# Patient Record
Sex: FEMALE | URBAN - METROPOLITAN AREA
[De-identification: names, ages, dates, MRNs, and addresses within clinical notes are randomized per-mention and may not be internally consistent; named-entity substitution may affect disease eponyms.]

---

## 2023-04-14 ENCOUNTER — HOSPITAL ENCOUNTER (OUTPATIENT)
Dept: GENERAL RADIOLOGY | Facility: HOSPITAL | Age: 23
Discharge: HOME | End: 2023-04-14
Attending: PREVENTIVE MEDICINE

## 2023-04-14 ENCOUNTER — OFFICE VISIT (OUTPATIENT)
Dept: OTHER | Facility: HOSPITAL | Age: 23
End: 2023-04-14
Attending: PREVENTIVE MEDICINE
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
## 2023-04-14 DIAGNOSIS — M25.532 ACUTE PAIN OF LEFT WRIST: ICD-10-CM

## 2023-04-14 DIAGNOSIS — M25.532 ACUTE PAIN OF LEFT WRIST: Primary | ICD-10-CM

## 2023-04-14 PROCEDURE — 73110 X-RAY EXAM OF WRIST: CPT | Performed by: PREVENTIVE MEDICINE

## 2025-03-20 LAB
AMB EXT CREATININE, URINE: 162.2 MG/DL
AMB EXT MALB URINE CALC: 1 MG/24HR
AMB EXT MALB URINE CALC: 1 MG/24HR

## 2025-05-09 ENCOUNTER — OFFICE VISIT (OUTPATIENT)
Dept: INTERNAL MEDICINE CLINIC | Facility: CLINIC | Age: 25
End: 2025-05-09
Payer: MEDICAID

## 2025-05-09 VITALS
HEART RATE: 96 BPM | SYSTOLIC BLOOD PRESSURE: 106 MMHG | BODY MASS INDEX: 27.16 KG/M2 | WEIGHT: 165 LBS | TEMPERATURE: 98 F | HEIGHT: 65.51 IN | DIASTOLIC BLOOD PRESSURE: 64 MMHG | OXYGEN SATURATION: 98 %

## 2025-05-09 DIAGNOSIS — N92.1 MENORRHAGIA WITH IRREGULAR CYCLE: ICD-10-CM

## 2025-05-09 DIAGNOSIS — Z13.0 SCREENING FOR DEFICIENCY ANEMIA: ICD-10-CM

## 2025-05-09 DIAGNOSIS — E10.9 TYPE 1 DIABETES MELLITUS WITHOUT COMPLICATION (HCC): Primary | ICD-10-CM

## 2025-05-09 DIAGNOSIS — N92.6 IRREGULAR PERIODS/MENSTRUAL CYCLES: ICD-10-CM

## 2025-05-09 DIAGNOSIS — H90.3 SENSORINEURAL HEARING LOSS (SNHL) OF BOTH EARS: ICD-10-CM

## 2025-05-09 DIAGNOSIS — E06.3 HYPOTHYROIDISM DUE TO HASHIMOTO THYROIDITIS: ICD-10-CM

## 2025-05-09 DIAGNOSIS — Z12.4 CERVICAL CANCER SCREENING: ICD-10-CM

## 2025-05-09 DIAGNOSIS — Z00.00 PHYSICAL EXAM, ANNUAL: ICD-10-CM

## 2025-05-09 DIAGNOSIS — E55.9 VITAMIN D DEFICIENCY: ICD-10-CM

## 2025-05-09 PROBLEM — E03.9 HYPOTHYROIDISM: Status: ACTIVE | Noted: 2021-10-18

## 2025-05-09 RX ORDER — GLUCOSAMINE HCL/CHONDROITIN SU 500-400 MG
CAPSULE ORAL
COMMUNITY
Start: 2024-09-07

## 2025-05-09 RX ORDER — INSULIN GLARGINE 100 [IU]/ML
32 INJECTION, SOLUTION SUBCUTANEOUS DAILY
COMMUNITY

## 2025-05-09 RX ORDER — BLOOD-GLUCOSE SENSOR
EACH MISCELLANEOUS
COMMUNITY

## 2025-05-09 RX ORDER — PROCHLORPERAZINE 25 MG/1
SUPPOSITORY RECTAL
COMMUNITY

## 2025-05-09 RX ORDER — INSULIN LISPRO 100 [IU]/ML
14 INJECTION, SOLUTION INTRAVENOUS; SUBCUTANEOUS 3 TIMES DAILY
COMMUNITY
Start: 2024-09-07

## 2025-05-09 RX ORDER — LEVOTHYROXINE SODIUM 175 MCG
TABLET ORAL
COMMUNITY
Start: 2024-02-27

## 2025-05-09 RX ORDER — INSULIN LISPRO 100 [IU]/ML
INJECTION, SOLUTION INTRAVENOUS; SUBCUTANEOUS
COMMUNITY

## 2025-05-09 NOTE — PATIENT INSTRUCTIONS
VISIT SUMMARY:  Today, we discussed your irregular menstrual cycles, type 1 diabetes management, thyroid dysfunction, and hearing loss. We reviewed your current medications and their potential impact on your symptoms. We also planned follow-up actions to better manage your conditions.    YOUR PLAN:  -TYPE 1 DIABETES MELLITUS: Type 1 diabetes is a condition where your body does not produce insulin, requiring you to manage your blood sugar levels with an insulin pump. Your recent hemoglobin A1c level of 9.4% indicates that your blood sugar levels have been higher than desired. Please inform your endocrinologist about this result, continue using your insulin pump, and monitor your blood glucose levels regularly.    -HYPOTHYROIDISM DUE TO AUTOIMMUNE THYROIDITIS: Hypothyroidism is a condition where your thyroid gland does not produce enough hormones, which can affect your overall health. Your recent lapse in taking Synthroid has led to high TSH levels. You should take Synthroid 200 mcg daily with a small amount of juice followed by a full glass of water to help with absorption. We will repeat your thyroid function tests in 3 weeks to see if the current dosage is effective.    -IRREGULAR MENSTRUAL CYCLE: Irregular menstrual cycles can be caused by various factors, including thyroid dysfunction. Since you have had prolonged bleeding episodes and a history of thyroid issues, which might be related, we recommend seeing a gynecologist for a comprehensive evaluation.    INSTRUCTIONS:  Please follow up with your endocrinologist to discuss your elevated hemoglobin A1c level. Continue monitoring your blood glucose levels regularly. Take Synthroid 200 mcg daily as instructed and repeat thyroid function tests in 3 weeks. Schedule an appointment with a gynecologist for further evaluation of your irregular menstrual cycles.    Contains text generated by [employer]

## 2025-05-09 NOTE — PROGRESS NOTES
OFFICE NOTE       The following individual(s) verbally consented to be recorded using ambient AI listening technology and understand that they can each withdraw their consent to this listening technology at any point by asking the clinician to turn off or pause the recording:    Patient name: Valeria Laureano        Patient ID: Valeria Laureano is a 24 year old female.  Today's Date: 05/09/25  Chief Complaint: Establish Care      History of Present Illness  Valeria Laureano is a 24 year old female with type 1 diabetes and thyroid dysfunction who presents with irregular menstrual cycles.    She has been experiencing irregular menstrual cycles over the past few months, with inconsistent periods. One month had minimal flow lasting more than seven days, while another month started on April 24th and ended on May 7th. There was a previous delay of over two months without any sexual activity. She suspects her medication might be influencing her menstrual irregularities.    She was diagnosed with type 1 diabetes at the age of 18 after her mother, following a family doctor's advice, tested her blood glucose levels, which were found to be high. Initially asymptomatic, she later noticed increased urination and thirst. She uses an insulin pump and has been managing her diabetes with the help of her endocrinologist for the past four years. Her hemoglobin A1c was recently recorded at 9.4, indicating poor glycemic control.    She has a history of thyroid dysfunction, initially identified at age 8. She has been prescribed levothyroxine and later Synthroid due to nausea with the former. She has not been taking her thyroid medication consistently, with a recent two-month lapse before resuming on May 5th. She takes Synthroid with juice to mitigate nausea, although this might affect absorption. Her last thyroid function test on March 20th showed elevated TSH levels.    She was born with bilateral hearing loss,  with her right ear being completely deaf and her left ear having 80% hearing loss. She uses a hearing aid in her left ear.    She works part-time as an  and  at a center in [place]. She has two younger brothers and her father has type 2 diabetes. She does not smoke or consume alcohol.           Vitals:    05/09/25 1157   BP: 106/64   Pulse: 96   Temp: 97.9 °F (36.6 °C)   TempSrc: Tympanic   SpO2: 98%   Weight: 165 lb (74.8 kg)   Height: 5' 5.51\" (1.664 m)     body mass index is 27.03 kg/m².  BP Readings from Last 3 Encounters:   05/09/25 106/64     The ASCVD Risk score (Tatiana NAGY, et al., 2019) failed to calculate for the following reasons:    The 2019 ASCVD risk score is only valid for ages 40 to 79  Results  LABS  TSH: elevated (03/20/2025)  HbA1c: 9.4% (03/20/2025)  Celiac disease screen: negative  Urine protein: within normal limits  Microalbumin: within normal limits  Urine glucose: elevated  Urine WBC: elevated  MCV: 75.5 fL       Medications reviewed:  Current Medications[1]      Assessment & Plan    1. Type 1 diabetes mellitus without complication (HCC) (Primary)  Type 1 diabetes mellitus diagnosed at age 18 with poor glycemic control, indicated by a hemoglobin A1c of 9.4%. She uses an insulin pump and is under endocrinological care. Recent increase in Synthroid dosage may affect glycemic control. She is actively managing her condition.  - Inform endocrinologist about elevated hemoglobin A1c of 9.4%  - Continue insulin pump therapy  - Monitor blood glucose levels regularly    2. Irregular periods/menstrual cycles  -     OBG Referral - In Network  -     Vitamin B12; Future; Expected date: 05/09/2025  -     Folic Acid Serum (Folate); Future; Expected date: 05/09/2025  -     Ferritin; Future; Expected date: 05/09/2025  -     Iron And Tibc; Future; Expected date: 05/09/2025    3. Menorrhagia with irregular cycle  Irregular menstrual cycles with recent prolonged bleeding episodes,  likely related to non-compliance with thyroid medication. No history of sexual activity or pregnancy. Referral to gynecologist for further evaluation and management.  - Refer to gynecologist for comprehensive evaluation  -     Vitamin B12; Future; Expected date: 05/09/2025  -     Folic Acid Serum (Folate); Future; Expected date: 05/09/2025  -     Ferritin; Future; Expected date: 05/09/2025  -     Iron And Tibc; Future; Expected date: 05/09/2025  -     CBC With Differential With Platelet; Future; Expected date: 05/09/2025    4. Sensorineural hearing loss (SNHL) of both ears  Bilateral hearing loss present since birth. Right ear is completely deaf (20% hearing), left ear has 80% hearing. She uses a hearing aid in the left ear.    5. Physical exam, annual  -     Vitamin B12; Future; Expected date: 05/09/2025  -     Folic Acid Serum (Folate); Future; Expected date: 05/09/2025  -     Ferritin; Future; Expected date: 05/09/2025  -     Iron And Tibc; Future; Expected date: 05/09/2025  -     CBC With Differential With Platelet; Future; Expected date: 05/09/2025  -     Comp Metabolic Panel (14); Future; Expected date: 05/09/2025  -     Lipid Panel; Future; Expected date: 05/09/2025  -     Vitamin D; Future; Expected date: 05/09/2025    6. Screening for deficiency anemia  -     Vitamin B12; Future; Expected date: 05/09/2025  -     Folic Acid Serum (Folate); Future; Expected date: 05/09/2025  -     Ferritin; Future; Expected date: 05/09/2025  -     Iron And Tibc; Future; Expected date: 05/09/2025  -     CBC With Differential With Platelet; Future; Expected date: 05/09/2025      10. Hypothyroidism due to Hashimoto thyroiditis  Hypothyroidism secondary to autoimmune thyroiditis with recent non-compliance to Synthroid due to nausea, resulting in a two-month lapse. Recent TSH levels were high. Synthroid dosage increased to 200 mcg. She experiences nausea when taking medication with water, prefers juice which may interfere with  absorption. Proper thyroid function is essential for overall health, including menstrual regularity, energy levels, and organ function.  - Educate on proper Synthroid administration: take with a small amount of juice followed by a full glass of water  - Repeat thyroid function tests in 3 weeks to assess current Synthroid dosage  - Continue Synthroid 200 mcg daily    11. Cervical cancer screening  -     OBG Referral - In Network         Follow Up: As needed/if symptoms worsen or No follow-ups on file..     I spent  minutes obtaining pertitent medical history, reviewing pertinent imaging/labs and specialists notes, evaluating patient, discussing differential diagnosis' and various treatment options, reinforcing importance of compliance with treatment plan, and completing documentation.       Objective/ Results:   Physical Exam  Vitals reviewed.   Constitutional:       General: She is not in acute distress.     Appearance: Normal appearance. She is not ill-appearing.   HENT:      Head: Normocephalic and atraumatic.   Eyes:      Extraocular Movements: Extraocular movements intact.      Conjunctiva/sclera: Conjunctivae normal.      Pupils: Pupils are equal, round, and reactive to light.   Cardiovascular:      Rate and Rhythm: Normal rate and regular rhythm.      Heart sounds: No murmur heard.     No gallop.   Pulmonary:      Effort: Pulmonary effort is normal. No respiratory distress.      Breath sounds: Normal breath sounds. No stridor. No wheezing, rhonchi or rales.   Musculoskeletal:      Right lower leg: No edema.      Left lower leg: No edema.   Skin:     General: Skin is warm.      Capillary Refill: Capillary refill takes less than 2 seconds.   Neurological:      General: No focal deficit present.      Mental Status: She is alert and oriented to person, place, and time.   Psychiatric:         Mood and Affect: Mood normal.         Behavior: Behavior normal.         Thought Content: Thought content normal.          Judgment: Judgment normal.           Reviewed:    Patient Active Problem List    Diagnosis    Hypothyroidism     Hypothyroidism (acquired); [id number]: Hypothyroidism (acquired)      Type 1 diabetes mellitus without complication (HCC)    Sensorineural hearing loss (SNHL) of both ears      Allergies[2]   Short Social Hx on File[3]   Review of Systems   Constitutional: Negative.    HENT: Negative.     Eyes: Negative.    Respiratory: Negative.     Cardiovascular: Negative.    Gastrointestinal: Negative.    Endocrine: Negative.    Genitourinary: Negative.    Musculoskeletal: Negative.    Neurological: Negative.        All other systems negative unless otherwise stated in ROS or HPI above.     45 minutes spent on provision of medical services today, including chart review, obtaining and reviewing history, performing medically appropriate examination, counseling and educating patient and/or caregiver, ordering testing , medications, referrals or procedures, my independent interpretation of results, communication of results to patient and /or caregiver, care coordination, and documentation of all of the above.       Shiloh Casas MD  Internal Medicine       Call office with any questions or seek emergency care if necessary.   Patient understands and agrees to follow directions and advice.      ----------------------------------------- PATIENT INSTRUCTIONS-----------------------------------------     Patient Instructions   VISIT SUMMARY:  Today, we discussed your irregular menstrual cycles, type 1 diabetes management, thyroid dysfunction, and hearing loss. We reviewed your current medications and their potential impact on your symptoms. We also planned follow-up actions to better manage your conditions.    YOUR PLAN:  -TYPE 1 DIABETES MELLITUS: Type 1 diabetes is a condition where your body does not produce insulin, requiring you to manage your blood sugar levels with an insulin pump. Your recent hemoglobin A1c level  of 9.4% indicates that your blood sugar levels have been higher than desired. Please inform your endocrinologist about this result, continue using your insulin pump, and monitor your blood glucose levels regularly.    -HYPOTHYROIDISM DUE TO AUTOIMMUNE THYROIDITIS: Hypothyroidism is a condition where your thyroid gland does not produce enough hormones, which can affect your overall health. Your recent lapse in taking Synthroid has led to high TSH levels. You should take Synthroid 200 mcg daily with a small amount of juice followed by a full glass of water to help with absorption. We will repeat your thyroid function tests in 3 weeks to see if the current dosage is effective.    -IRREGULAR MENSTRUAL CYCLE: Irregular menstrual cycles can be caused by various factors, including thyroid dysfunction. Since you have had prolonged bleeding episodes and a history of thyroid issues, which might be related, we recommend seeing a gynecologist for a comprehensive evaluation.    INSTRUCTIONS:  Please follow up with your endocrinologist to discuss your elevated hemoglobin A1c level. Continue monitoring your blood glucose levels regularly. Take Synthroid 200 mcg daily as instructed and repeat thyroid function tests in 3 weeks. Schedule an appointment with a gynecologist for further evaluation of your irregular menstrual cycles.    Contains text generated by [employer]          The 21st Century Cures Act makes medical notes available to patients in the interest of transparency.  However, please be advised that this is a medical document.  It is intended as a peer to peer communication.  It is written in medical language and may contain abbreviations or verbiage that are technical and unfamiliar.  It may appear blunt or direct.  Medical documents are intended to carry relevant information, facts as evident, and the clinical opinion of the practitioner.          [1]   Current Outpatient Medications   Medication Sig Dispense Refill     Insulin Lispro, 1 Unit Dial, (ADMELOG SOLOSTAR) 100 UNIT/ML Subcutaneous Solution Pen-injector Inject 14 Units into the skin 3 (three) times daily.      SYNTHROID 175 MCG Oral Tab Take 1 tablet every day by oral route for 90 days.      metFORMIN 500 MG Oral Tab Take 2 tablets (1,000 mg total) by mouth 2 (two) times daily with meals.      insulin glargine 100 UNIT/ML Subcutaneous Solution Pen-injector Inject 42 Units into the skin daily.      insulin glargine (BASAGLAR KWIKPEN) 100 UNIT/ML Subcutaneous Solution Pen-injector Inject 32 Units into the skin daily.      Glucose 4-6 GM-MG Oral Chew Tab Chew 16 g by mouth.      Continuous Glucose  (DEXCOM G6 ) Does not apply Device Dexcom G6       Alcohol Swabs 70 % Does not apply Pads APPLY 4 PADS EVERY DAY BY TOPICAL ROUTE AS DIRECTED FOR 90 DAYS      Continuous Glucose Sensor (GUARDIAN 4 GLUCOSE SENSOR) Does not apply Misc USE AS DIRECTED. CHANGE EVERY 10 DAYS FOR 90 DAYS      insulin lispro (HUMALOG) 100 UNIT/ML Injection Solution Inject into the skin 3 (three) times daily before meals.      Continuous Glucose Sensor (DEXCOM G6 SENSOR) Does not apply Misc Dexcom G6 Sensor device   USE SUBCUTANEOUSLY DAILY AS DIRECTED (Patient not taking: Reported on 5/9/2025)     [2] Not on File  [3]   Social History  Socioeconomic History    Marital status: Single   Tobacco Use    Smoking status: Never     Passive exposure: Never    Smokeless tobacco: Never   Vaping Use    Vaping status: Never Used   Substance and Sexual Activity    Alcohol use: Never    Drug use: Never   Other Topics Concern    Caffeine Concern No    Exercise No    Special Diet No    Stress Concern No     Social Drivers of Health     Food Insecurity: Low Risk  (9/5/2024)    Received from [facility]    Food Insecurity     Have there been times that your food ran out, and you didn't have money to get more?: No     Are there times that you worry that this might happen?: No    Transportation Needs: Low Risk  (9/5/2024)    Received from [facility]    Transportation Needs     Do you have trouble getting transportation to medical appointments?: No   Housing Stability: Low Risk  (9/5/2024)    Received from [facility]    Housing Stability     Are you worried that your electric, gas, oil, or water might be shut off?: No     Are you concerned about having a safe and reliable place to live?: No

## 2025-05-19 ENCOUNTER — LAB ENCOUNTER (OUTPATIENT)
Dept: LAB | Age: 25
End: 2025-05-19
Attending: STUDENT IN AN ORGANIZED HEALTH CARE EDUCATION/TRAINING PROGRAM
Payer: MEDICAID

## 2025-05-19 DIAGNOSIS — E04.2 NONTOXIC MULTINODULAR GOITER: ICD-10-CM

## 2025-05-19 DIAGNOSIS — E87.0 TYPE I DIABETES MELLITUS WITH HYPEROSMOLAR COMA (HCC): Primary | ICD-10-CM

## 2025-05-19 DIAGNOSIS — N92.6 IRREGULAR PERIODS/MENSTRUAL CYCLES: ICD-10-CM

## 2025-05-19 DIAGNOSIS — N92.1 MENORRHAGIA WITH IRREGULAR CYCLE: ICD-10-CM

## 2025-05-19 DIAGNOSIS — I51.9 MYXEDEMA HEART DISEASE: ICD-10-CM

## 2025-05-19 DIAGNOSIS — E03.9 MYXEDEMA HEART DISEASE: ICD-10-CM

## 2025-05-19 DIAGNOSIS — Z13.0 SCREENING FOR DEFICIENCY ANEMIA: ICD-10-CM

## 2025-05-19 DIAGNOSIS — E10.69 TYPE I DIABETES MELLITUS WITH HYPEROSMOLAR COMA (HCC): Primary | ICD-10-CM

## 2025-05-19 DIAGNOSIS — Z00.00 PHYSICAL EXAM, ANNUAL: ICD-10-CM

## 2025-05-19 DIAGNOSIS — E10.65 TYPE I DIABETES MELLITUS WITH HYPEROSMOLAR COMA (HCC): Primary | ICD-10-CM

## 2025-05-19 LAB
ALBUMIN SERPL-MCNC: 4.5 G/DL (ref 3.2–4.8)
ALBUMIN/GLOB SERPL: 1.7 {RATIO} (ref 1–2)
ALP LIVER SERPL-CCNC: 146 U/L (ref 37–98)
ALT SERPL-CCNC: 36 U/L (ref 10–49)
ANION GAP SERPL CALC-SCNC: 8 MMOL/L (ref 0–18)
AST SERPL-CCNC: 27 U/L (ref ?–34)
BASOPHILS # BLD AUTO: 0.06 X10(3) UL (ref 0–0.2)
BASOPHILS NFR BLD AUTO: 0.6 %
BILIRUB SERPL-MCNC: 0.4 MG/DL (ref 0.3–1.2)
BUN BLD-MCNC: 10 MG/DL (ref 9–23)
CALCIUM BLD-MCNC: 9.4 MG/DL (ref 8.7–10.6)
CHLORIDE SERPL-SCNC: 108 MMOL/L (ref 98–112)
CHOLEST SERPL-MCNC: 149 MG/DL (ref ?–200)
CO2 SERPL-SCNC: 25 MMOL/L (ref 21–32)
CREAT BLD-MCNC: 0.71 MG/DL (ref 0.55–1.02)
DEPRECATED HBV CORE AB SER IA-ACNC: 7 NG/ML (ref 50–306)
EGFRCR SERPLBLD CKD-EPI 2021: 120 ML/MIN/1.73M2 (ref 60–?)
EOSINOPHIL # BLD AUTO: 0.16 X10(3) UL (ref 0–0.7)
EOSINOPHIL NFR BLD AUTO: 1.7 %
ERYTHROCYTE [DISTWIDTH] IN BLOOD BY AUTOMATED COUNT: 15.9 %
EST. AVERAGE GLUCOSE BLD GHB EST-MCNC: 200 MG/DL (ref 68–126)
FASTING PATIENT LIPID ANSWER: YES
FASTING STATUS PATIENT QL REPORTED: YES
FOLATE SERPL-MCNC: 13.6 NG/ML (ref 5.4–?)
GLOBULIN PLAS-MCNC: 2.6 G/DL (ref 2–3.5)
GLUCOSE BLD-MCNC: 88 MG/DL (ref 70–99)
HBA1C MFR BLD: 8.6 % (ref ?–5.7)
HCT VFR BLD AUTO: 40.5 % (ref 35–48)
HDLC SERPL-MCNC: 45 MG/DL (ref 40–59)
HGB BLD-MCNC: 12 G/DL (ref 12–16)
IMM GRANULOCYTES # BLD AUTO: 0.03 X10(3) UL (ref 0–1)
IMM GRANULOCYTES NFR BLD: 0.3 %
IRON SATN MFR SERPL: 7 % (ref 15–50)
IRON SERPL-MCNC: 27 UG/DL (ref 50–170)
LDLC SERPL CALC-MCNC: 87 MG/DL (ref ?–100)
LYMPHOCYTES # BLD AUTO: 2.37 X10(3) UL (ref 1–4)
LYMPHOCYTES NFR BLD AUTO: 24.7 %
MCH RBC QN AUTO: 22.9 PG (ref 26–34)
MCHC RBC AUTO-ENTMCNC: 29.6 G/DL (ref 31–37)
MCV RBC AUTO: 77.3 FL (ref 80–100)
MONOCYTES # BLD AUTO: 0.52 X10(3) UL (ref 0.1–1)
MONOCYTES NFR BLD AUTO: 5.4 %
NEUTROPHILS # BLD AUTO: 6.45 X10 (3) UL (ref 1.5–7.7)
NEUTROPHILS # BLD AUTO: 6.45 X10(3) UL (ref 1.5–7.7)
NEUTROPHILS NFR BLD AUTO: 67.3 %
NONHDLC SERPL-MCNC: 104 MG/DL (ref ?–130)
OSMOLALITY SERPL CALC.SUM OF ELEC: 290 MOSM/KG (ref 275–295)
PLATELET # BLD AUTO: 286 10(3)UL (ref 150–450)
POTASSIUM SERPL-SCNC: 3.8 MMOL/L (ref 3.5–5.1)
PROT SERPL-MCNC: 7.1 G/DL (ref 5.7–8.2)
RBC # BLD AUTO: 5.24 X10(6)UL (ref 3.8–5.3)
SODIUM SERPL-SCNC: 141 MMOL/L (ref 136–145)
T4 FREE SERPL-MCNC: 1.5 NG/DL (ref 0.8–1.7)
TOTAL IRON BINDING CAPACITY: 395 UG/DL (ref 250–425)
TRANSFERRIN SERPL-MCNC: 313 MG/DL (ref 250–380)
TRIGL SERPL-MCNC: 92 MG/DL (ref 30–149)
TSI SER-ACNC: 3.08 UIU/ML (ref 0.55–4.78)
VIT B12 SERPL-MCNC: 291 PG/ML (ref 211–911)
VIT D+METAB SERPL-MCNC: 25.7 NG/ML (ref 30–100)
VLDLC SERPL CALC-MCNC: 15 MG/DL (ref 0–30)
WBC # BLD AUTO: 9.6 X10(3) UL (ref 4–11)

## 2025-05-19 PROCEDURE — 80053 COMPREHEN METABOLIC PANEL: CPT

## 2025-05-19 PROCEDURE — 82607 VITAMIN B-12: CPT

## 2025-05-19 PROCEDURE — 82746 ASSAY OF FOLIC ACID SERUM: CPT

## 2025-05-19 PROCEDURE — 83550 IRON BINDING TEST: CPT

## 2025-05-19 PROCEDURE — 82728 ASSAY OF FERRITIN: CPT

## 2025-05-19 PROCEDURE — 36415 COLL VENOUS BLD VENIPUNCTURE: CPT

## 2025-05-19 PROCEDURE — 83540 ASSAY OF IRON: CPT

## 2025-05-19 PROCEDURE — 80061 LIPID PANEL: CPT

## 2025-05-19 PROCEDURE — 84443 ASSAY THYROID STIM HORMONE: CPT

## 2025-05-19 PROCEDURE — 85025 COMPLETE CBC W/AUTO DIFF WBC: CPT

## 2025-05-19 PROCEDURE — 83036 HEMOGLOBIN GLYCOSYLATED A1C: CPT

## 2025-05-19 PROCEDURE — 82306 VITAMIN D 25 HYDROXY: CPT

## 2025-05-19 PROCEDURE — 84439 ASSAY OF FREE THYROXINE: CPT

## 2025-06-26 ENCOUNTER — OFFICE VISIT (OUTPATIENT)
Facility: CLINIC | Age: 25
End: 2025-06-26
Payer: MEDICAID

## 2025-06-26 VITALS
SYSTOLIC BLOOD PRESSURE: 126 MMHG | HEART RATE: 91 BPM | WEIGHT: 170 LBS | HEIGHT: 65 IN | DIASTOLIC BLOOD PRESSURE: 78 MMHG | BODY MASS INDEX: 28.32 KG/M2

## 2025-06-26 DIAGNOSIS — R14.0 ABDOMINAL DISTENSION: ICD-10-CM

## 2025-06-26 DIAGNOSIS — Z91.199 NON COMPLIANCE WITH MEDICAL TREATMENT: ICD-10-CM

## 2025-06-26 DIAGNOSIS — N92.6 IRREGULAR PERIODS: Primary | ICD-10-CM

## 2025-06-26 DIAGNOSIS — L68.0 HIRSUTISM: ICD-10-CM

## 2025-06-26 DIAGNOSIS — E10.65 POORLY CONTROLLED TYPE 1 DIABETES MELLITUS (HCC): ICD-10-CM

## 2025-06-26 DIAGNOSIS — Z11.3 SCREENING EXAMINATION FOR STD (SEXUALLY TRANSMITTED DISEASE): ICD-10-CM

## 2025-06-26 LAB
CONTROL LINE PRESENT WITH A CLEAR BACKGROUND (YES/NO): YES YES/NO
KIT LOT #: NORMAL NUMERIC
PREGNANCY TEST, URINE: NEGATIVE

## 2025-06-26 PROCEDURE — 87591 N.GONORRHOEAE DNA AMP PROB: CPT | Performed by: OBSTETRICS & GYNECOLOGY

## 2025-06-26 PROCEDURE — 81025 URINE PREGNANCY TEST: CPT | Performed by: OBSTETRICS & GYNECOLOGY

## 2025-06-26 PROCEDURE — 87491 CHLMYD TRACH DNA AMP PROBE: CPT | Performed by: OBSTETRICS & GYNECOLOGY

## 2025-06-26 PROCEDURE — 99214 OFFICE O/P EST MOD 30 MIN: CPT | Performed by: OBSTETRICS & GYNECOLOGY

## 2025-06-26 RX ORDER — MEDROXYPROGESTERONE ACETATE 10 MG
10 TABLET ORAL NIGHTLY
Qty: 10 TABLET | Refills: 0 | Status: SHIPPED | OUTPATIENT
Start: 2025-06-26 | End: 2025-07-06

## 2025-06-26 NOTE — H&P
[facility]  Obstetrics and Gynecology   History & Physical  NEW    The 21st Century Cures Act makes medical notes like these available to patients in the interest of transparency. Please be advised this is a medical document. Medical documents are intended to carry relevant information, facts as evident, and the clinical opinion of the practitioner. The medical note is intended as peer to peer communication and may appear blunt or direct. It is written in medical language and may contain abbreviations or verbiage that are unfamiliar.     Chief complaint:   Chief Complaint   Patient presents with    Gyn Problem     New Patient- Referred by PCP for irregular periods  -patient reports periods are lighter now and irregular.        Subjective:     HPI: Valeria Laureano is a 24 year old  with Patient's last menstrual period was 2025 (exact date).   Here for irregular menses. Referred by PCP  Chaperone for exam declines    DM Type 1 (dx age 18, on insulin pump), Hypothyroidism (problems starting at age 8), SNHL both ears (Right ear deaf, Left ear 80% hearing loss - hearing aid used in left ear), irregular menses, vit D deficiency, iron deficiency    22 Admission for DKA at [facility]    23 PCP Shiloh Casas MD   -irregular menstrual cycles over the past few months, with inconsistent periods. One month had minimal flow lasting more than seven days, while another month started on  and ended on May 7th. There was a previous delay of over two months without any sexual activity. She suspects her medication might be influencing her menstrual irregularities.   -hemoglobin A1c 9.4, indicating poor glycemic control   -thyroid dysfunction, initially identified at age 8. She has been prescribed levothyroxine and later Synthroid due to nausea with the former. She has not been taking her thyroid medication consistently, with a recent two-month lapse before resuming on May 5th.  Her last thyroid function test on March 20th showed elevated TSH levels.   -Irregular menstrual cycles with recent prolonged bleeding episodes, likely related to non-compliance with thyroid medication.   -Labs done: CBC, CMP, ferritin, iron, vit D, folic acid, lipids, vit B12  -Referred to GYN for menometrorrhagia     ~~~~~~~~~~~~~~~~~~~~  6/26/2025 Referred by PCP for a few months of irregular menses. Patient admitted to PCP that she had not been taking her thyroid medication consistently & had at least a 2 month gap in taking her medication with elevated TSH level noted. Her DM1 is not well controlled either with A1c 9.4.    H/o usually regular menses every month with heavier flow about every 3-4 months or so, significant cramping since menarche.     Since 2/2025 - the periods got irregular.   The periods are either shorter or longer cycles than normal & not painful like her usual periods.   Sometimes skips up to 2 months at a time.     Denies possibility of pregnancy  Denies history of penetrative intercourse    Had a different insulin pump  Sugars were all over   The last 1 month she has a new pump & the sugars are better.      Was off her thyroid medication  Has been taking the Synthroid lately & reports thyroid went back into normal range.     A few weeks ago had some diarrhea.   Some stomach pain before she came here & is constipated today.   Water - fair    BMI overweight   Exercise - walking lot   Diet - no big changes   Eating disorders N  Weight change? Stable   Stress level not too bad  Sleep - 7 hr or so   Hirsutism some on the chin - some plucking   Acne a little on shoulders & face   Galactorrhea - no. No breast changes   Headache - infrequent. Sometimes black dots in vision during the headache. For about 30 seconds  Vision changes N     Labs for irregular menses N  Pelvic US - N  Endometrial biopsy or hysteroscopy/D&C? N      Patient Care Team:  Shiloh Casas MD as PCP - General (Internal Medicine)      Review of Systems   Constitutional:  Negative for diaphoresis and unexpected weight change.        A few times was hot at night     BMI overweight   Exercise - walking lot   Diet - no big changes   Eating disorders N  Weight change? Stable    Eyes:  Negative for visual disturbance.   Gastrointestinal:  Positive for constipation.        A few weeks ago had some diarrhea.   Some stomach pain before she came here & is constipated today.   Water - fair   Endocrine: Negative for cold intolerance and heat intolerance.   Genitourinary:  Positive for menstrual problem.   Skin:         Hirsutism some on the chin - some plucking   Acne a little on shoulders & face   Galactorrhea - no. No breast changes    Neurological:  Positive for headaches.        Headache - infrequent. Sometimes black dots in vision during the headache. For about 30 seconds   Psychiatric/Behavioral:  Negative for dysphoric mood and sleep disturbance. The patient is not nervous/anxious.         Stress level not too bad  Sleep - 7 hr or so      GYN Hx:   Menarche 13 x 28-30 predictable x 3-5 days x variable flow. Mainly normal flow.   Every 3-4 months the bleeding could be heavier.   No accidents   Can make it through the night  Pads - #4 or #5. Typically changes twice a day. No accidents  Sometimes clots dime sized or anthony sized.   Hasn't tried tampons     Dysmenorrhea - 1st day severe & then tapers off over the next day.   Would stay in bed for the 1st day.   Would miss school  Not missing work - pushes through  Ibuprofen - 200 or 400 mg - will take 1 time in a day.     Patient's last menstrual period was 05/08/2025 (exact date).  Pap Date:  (per patient never had a pap smear)    No current relationship  Sexually active? Never .   Dyspareunia? NA  Postcoital bleeding? NA    Plans to conceive within next 1 year? N    Contraception: abstinence   STDs: N  HPV vaccine N    Cervical cancer screening:   Pap never     Breast cancer screening:  Mammogram:  N    Colon cancer screening:  Colonoscopy N    OB History:  OB History    Para Term  AB Living   0 0 0 0 0 0   SAB IAB Ectopic Multiple Live Births   0 0 0 0 0       Medications:  Current Outpatient Medications   Medication Sig Dispense Refill    medroxyPROGESTERone Acetate 10 MG Oral Tab Take 1 tablet (10 mg total) by mouth nightly for 10 days. Within 7 days of completing the course, a bleed should occur. 10 tablet 0    SYNTHROID 175 MCG Oral Tab Take 1 tablet every day by oral route for 90 days.      metFORMIN 500 MG Oral Tab Take 2 tablets (1,000 mg total) by mouth 2 (two) times daily with meals.      Insulin Lispro, 1 Unit Dial, (ADMELOG SOLOSTAR) 100 UNIT/ML Subcutaneous Solution Pen-injector Inject 14 Units into the skin 3 (three) times daily.      insulin glargine 100 UNIT/ML Subcutaneous Solution Pen-injector Inject 42 Units into the skin daily.      insulin glargine (BASAGLAR KWIKPEN) 100 UNIT/ML Subcutaneous Solution Pen-injector Inject 32 Units into the skin daily.      Glucose 4-6 GM-MG Oral Chew Tab Chew 16 g by mouth.      Continuous Glucose  (DEXCOM G6 ) Does not apply Device Dexcom G6       Alcohol Swabs 70 % Does not apply Pads APPLY 4 PADS EVERY DAY BY TOPICAL ROUTE AS DIRECTED FOR 90 DAYS      Continuous Glucose Sensor (GUARDIAN 4 GLUCOSE SENSOR) Does not apply Misc USE AS DIRECTED. CHANGE EVERY 10 DAYS FOR 90 DAYS      Continuous Glucose Sensor (DEXCOM G6 SENSOR) Does not apply Misc Dexcom G6 Sensor device   USE SUBCUTANEOUSLY DAILY AS DIRECTED (Patient not taking: Reported on 2025)      insulin lispro (HUMALOG) 100 UNIT/ML Injection Solution Inject into the skin 3 (three) times daily before meals.        (Not in a hospital admission)    Prior to admission med list:   (Not in a hospital admission)    Inpatient Current Medication List:  No current facility-administered medications for this visit.     Scheduled Meds:     Continuous Infusions:     PRN  Medications:       All:  Not on File    PMH:  Past Medical History:   Diagnosis Date    DKA (diabetic ketoacidosis) ([facility]) 09/05/2024 9/5/24 Admission for DKA at [facility]    Hypothyroidism 2007    Migraine with aura 06/25/2025    Sometimes black dots in vision during the headache. For about 30 seconds    Sensorineural hearing loss (SNHL) of both ears 04/28/2016    Congenital hearing loss. Right ear deaf, Left ear 80% hearing loss - hearing aid used in left ear    Type 1 diabetes mellitus without complication ([facility]) 06/18/2019    Diagnosed at age 18    Uses hearing aid 2016    in left ear    Wears glasses         PSH:  Past Surgical History:   Procedure Laterality Date    Dental surgery procedure      surgery to redirect how the teeth were aligning.       Social History:  Tobacco:  She has never smoked tobacco.     Social History     Socioeconomic History    Marital status: Single   Tobacco Use    Smoking status: Never     Passive exposure: Never    Smokeless tobacco: Never   Vaping Use    Vaping status: Never Used   Substance and Sexual Activity    Alcohol use: Never    Drug use: Never    Sexual activity: Never     Birth control/protection: Abstinence     Comment: denies history of penetrative intercourse   Other Topics Concern    Caffeine Concern No    Exercise No    Seat Belt No    Special Diet No    Stress Concern No    Weight Concern No     Social Drivers of Health     Food Insecurity: Low Risk  (9/5/2024)    Received from [facility]    Food Insecurity     Have there been times that your food ran out, and you didn't have money to get more?: No     Are there times that you worry that this might happen?: No   Transportation Needs: Low Risk  (9/5/2024)    Received from [facility]    Transportation Needs     Do you have trouble getting transportation to medical appointments?: No   Housing Stability: Low Risk  (9/5/2024)    Received from [facility]  [facility]    Housing Stability     Are you worried that your electric, gas, oil, or water might be shut off?: No     Are you concerned about having a safe and reliable place to live?: No        Family History:  Family History   Problem Relation Age of Onset    Diabetes Father 45        type 2    Breast Cancer Neg     Ovarian Cancer Neg     Colon Cancer Neg     Birth Defects Neg     Genetic Disease Neg     Endometriosis Neg     Infertility Neg     Thyroid disease Neg        Immunization History:  Immunization History   Administered Date(s) Administered    Covid-19 Vaccine Moderna 100 mcg/0.5 ml 12/18/2021    Tb Intradermal Test 07/15/2021       Depression screening  Depression Screening (PHQ-2/PHQ-9): Over the LAST 2 WEEKS                       Depression: Not at risk (5/9/2025)    PHQ-2     PHQ-2 Score: 1                 Objective:     Vitals:    06/26/25 1107   BP: 126/78   Pulse: 91   Weight: 170 lb (77.1 kg)   Height: 65\"     Body mass index is 28.29 kg/m².    BP Readings from Last 10 Encounters:   06/26/25 126/78   05/09/25 106/64        Wt Readings from Last 10 Encounters:   06/26/25 170 lb (77.1 kg)   05/09/25 165 lb (74.8 kg)       BMI Readings from Last 10 Encounters:   06/26/25 28.29 kg/m²   05/09/25 27.03 kg/m²         Physical Exam:  Physical Exam  Vitals and nursing note reviewed.   Constitutional:       Appearance: Normal appearance.   HENT:      Head: Normocephalic and atraumatic.   Eyes:      Extraocular Movements: Extraocular movements intact.      Conjunctiva/sclera: Conjunctivae normal.   Neck:      Comments: No thyromegaly  Cardiovascular:      Rate and Rhythm: Normal rate and regular rhythm.      Heart sounds: No murmur heard.  Pulmonary:      Effort: Pulmonary effort is normal.      Breath sounds: Normal breath sounds.   Abdominal:      General: There is distension.      Palpations: Abdomen is soft. There is no mass.      Tenderness: There is no abdominal tenderness. There is no  guarding or rebound.      Hernia: No hernia is present.      Comments: Slightly obese, Mild to moderately distended abdomen but no distinct mass appreciated. Not tympanitic. +Insulin pump   Genitourinary:     Comments: Deferred for time      Musculoskeletal:      Right lower leg: No edema.      Left lower leg: No edema.   Neurological:      General: No focal deficit present.      Mental Status: She is alert.   Psychiatric:         Mood and Affect: Mood normal.         Judgment: Judgment normal.      Comments: A few somewhat tangential thoughts         Labs:  Lab Results   Component Value Date    WBC 9.6 05/19/2025    RBC 5.24 05/19/2025    HGB 12.0 05/19/2025    HCT 40.5 05/19/2025    MCV 77.3 (L) 05/19/2025    MCH 22.9 (L) 05/19/2025    MCHC 29.6 (L) 05/19/2025    RDW 15.9 05/19/2025    .0 05/19/2025        Lab Results   Component Value Date    GLU 88 05/19/2025    BUN 10 05/19/2025    CREATSERUM 0.71 05/19/2025    ANIONGAP 8 05/19/2025    CA 9.4 05/19/2025    OSMOCALC 290 05/19/2025    ALKPHO 146 (H) 05/19/2025    AST 27 05/19/2025    ALT 36 05/19/2025    BILT 0.4 05/19/2025    TP 7.1 05/19/2025    ALB 4.5 05/19/2025    GLOBULIN 2.6 05/19/2025     05/19/2025    K 3.8 05/19/2025     05/19/2025    CO2 25.0 05/19/2025       Lab Results   Component Value Date    CHOLEST 149 05/19/2025    TRIG 92 05/19/2025    HDL 45 05/19/2025    LDL 87 05/19/2025    VLDL 15 05/19/2025    NONHDLC 104 05/19/2025        Lab Results   Component Value Date    T4F 1.5 05/19/2025    TSH 3.075 05/19/2025        Lab Results   Component Value Date     (H) 05/19/2025    A1C 8.6 (H) 05/19/2025       Imaging:  No results found.     Assessment:     Patient Active Problem List    Diagnosis    Non compliance with medical treatment    Poorly controlled type 1 diabetes mellitus (HCC)    Hypothyroidism     Hypothyroidism (acquired); [id number]: Hypothyroidism (acquired)      Type 1 diabetes mellitus without complication  (HCC)    Sensorineural hearing loss (SNHL) of both ears         Valeria Laureano is a 24 year old  female here for DM Type 1 (dx age 18, on insulin pump), Hypothyroidism (problems starting at age 8), SNHL both ears (Right ear deaf, Left ear 80% hearing loss - hearing aid used in left ear), irregular menses, vit D deficiency, iron deficiency    Here for recent onset of irregular menses since 2025. Typically would have quite painful periods about every month. Since 2025 cycles are either shorter or longer but they are not painful like her usual periods. Denies any major changes in her life but her diabetes is not well controlled and she was off her thyroid medication for multiple months. She does have some hirsutism on exam. Could have underlying PCOS.      Diagnoses and all orders for this visit:    Irregular periods  -     Urine Preg Test [36972]  -     17-OH-Progesterone; Future  -     Anti-Müllerian Hormone (AMH) ([employer]); Future  -     Dehydroepiandrosterone Sulfate; Future  -     Estradiol; Future  -     FSH; Future  -     Hemoglobin A1C; Future  -     Progesterone; Future  -     Prolactin; Future  -     TSH and Free T4; Future  -     Testosterone,Total and Weakly Bound w/ SHBG; Future  -     medroxyPROGESTERone Acetate 10 MG Oral Tab; Take 1 tablet (10 mg total) by mouth nightly for 10 days. Within 7 days of completing the course, a bleed should occur.  -     US PELVIS W EV (CPT=76856/93715); Future    Hirsutism  -     17-OH-Progesterone; Future  -     Dehydroepiandrosterone Sulfate; Future  -     Testosterone,Total and Weakly Bound w/ SHBG; Future  -     US PELVIS W EV (CPT=76856/12706); Future    Abdominal distension  -     US PELVIS W EV (CPT=76856/68889); Future    Poorly controlled type 1 diabetes mellitus (HCC)    Non compliance with medical treatment    Screening examination for STD (sexually transmitted disease)  -     Chlamydia/Gc Amplification; Future         Plan:      Irregular menses  -does have hirsutism and some acne   -possible underlying ovulatory dysfunction from PCOS exacerbated by non compliance with thyroid treatment and poorly controlled diabetes  -cycle day 3 labs     H/o significant dysmenorrhea   -may have underlying endometriosis, though only takes a very low dose of ibuprofen once each cycle  -pelvic US    Abdominal distension  -suspect constipation related  -pelvic US to rule out mass    Pap - never. Deferred for time   Breast exam - deferred for time  HPV vaccine is a candidate     STD screening   -at first patient said she had been sexually active but then denied this  -GC/CT off bloodwork    Contraception  -reports virginal/abstinent.   -no plans to conceive in the near future     BMI overweight   -healthy diet & exercise encouraged  #Blood pressure screen (BP must be <120/80 to be \"normal\")   -   BP Readings from Last 3 Encounters:   06/26/25 126/78   05/09/25 106/64   Patient referred to primary care provider for BP management     #Tobacco screen   Counseling given: No    RTC well woman exam with pap after labs & pelvic US done.      Diana Trinh MD  [facility] - OBGYN

## 2025-06-26 NOTE — PATIENT INSTRUCTIONS
\"Cycle Day 3 Labs\"    To obtain the most reliable results:     -Please schedule your labwork to be done on cycle day 3 (3rd day of period), early morning (must be done before 10 am), and fasting (8 hours is sufficient).     -If you need to have them done on cycle day 2, 4, or 5, that is acceptable as well.

## 2025-06-27 ENCOUNTER — TELEPHONE (OUTPATIENT)
Dept: INTERNAL MEDICINE CLINIC | Facility: CLINIC | Age: 25
End: 2025-06-27

## 2025-06-27 ENCOUNTER — OFFICE VISIT (OUTPATIENT)
Dept: INTERNAL MEDICINE CLINIC | Facility: CLINIC | Age: 25
End: 2025-06-27
Payer: MEDICAID

## 2025-06-27 VITALS
OXYGEN SATURATION: 97 % | HEART RATE: 91 BPM | TEMPERATURE: 97 F | HEIGHT: 65 IN | DIASTOLIC BLOOD PRESSURE: 66 MMHG | BODY MASS INDEX: 28.35 KG/M2 | WEIGHT: 170.13 LBS | SYSTOLIC BLOOD PRESSURE: 108 MMHG

## 2025-06-27 DIAGNOSIS — R74.8 ELEVATED ALKALINE PHOSPHATASE LEVEL: ICD-10-CM

## 2025-06-27 DIAGNOSIS — R79.89 ELEVATED LFTS: ICD-10-CM

## 2025-06-27 DIAGNOSIS — E10.9 TYPE 1 DIABETES MELLITUS WITHOUT COMPLICATION (HCC): Primary | ICD-10-CM

## 2025-06-27 DIAGNOSIS — E53.8 VITAMIN B12 DEFICIENCY: ICD-10-CM

## 2025-06-27 DIAGNOSIS — E06.3 HYPOTHYROIDISM DUE TO HASHIMOTO THYROIDITIS: ICD-10-CM

## 2025-06-27 DIAGNOSIS — N92.6 IRREGULAR PERIODS/MENSTRUAL CYCLES: ICD-10-CM

## 2025-06-27 DIAGNOSIS — E55.9 VITAMIN D DEFICIENCY: ICD-10-CM

## 2025-06-27 DIAGNOSIS — E61.1 IRON DEFICIENCY: ICD-10-CM

## 2025-06-27 LAB
C TRACH DNA SPEC QL NAA+PROBE: NEGATIVE
N GONORRHOEA DNA SPEC QL NAA+PROBE: NEGATIVE

## 2025-06-27 PROCEDURE — 99395 PREV VISIT EST AGE 18-39: CPT | Performed by: STUDENT IN AN ORGANIZED HEALTH CARE EDUCATION/TRAINING PROGRAM

## 2025-06-27 PROCEDURE — 90471 IMMUNIZATION ADMIN: CPT | Performed by: STUDENT IN AN ORGANIZED HEALTH CARE EDUCATION/TRAINING PROGRAM

## 2025-06-27 PROCEDURE — 90677 PCV20 VACCINE IM: CPT | Performed by: STUDENT IN AN ORGANIZED HEALTH CARE EDUCATION/TRAINING PROGRAM

## 2025-06-27 PROCEDURE — 96372 THER/PROPH/DIAG INJ SC/IM: CPT | Performed by: STUDENT IN AN ORGANIZED HEALTH CARE EDUCATION/TRAINING PROGRAM

## 2025-06-27 RX ORDER — FERROUS SULFATE 325(65) MG
325 TABLET, DELAYED RELEASE (ENTERIC COATED) ORAL
Qty: 90 TABLET | Refills: 2 | Status: SHIPPED | OUTPATIENT
Start: 2025-06-27

## 2025-06-27 RX ORDER — CYANOCOBALAMIN 1000 UG/ML
1000 INJECTION, SOLUTION INTRAMUSCULAR; SUBCUTANEOUS ONCE
Status: COMPLETED | OUTPATIENT
Start: 2025-06-27 | End: 2025-06-27

## 2025-06-27 RX ORDER — ERGOCALCIFEROL 1.25 MG/1
50000 CAPSULE, LIQUID FILLED ORAL WEEKLY
Qty: 12 CAPSULE | Refills: 0 | Status: SHIPPED | OUTPATIENT
Start: 2025-06-27

## 2025-06-27 RX ORDER — PROCHLORPERAZINE 25 MG/1
SUPPOSITORY RECTAL
COMMUNITY
End: 2025-06-28

## 2025-06-27 RX ADMIN — CYANOCOBALAMIN 1000 MCG: 1000 INJECTION, SOLUTION INTRAMUSCULAR; SUBCUTANEOUS at 11:35:00

## 2025-06-27 NOTE — PROGRESS NOTES
[facility]    CHIEF COMPLAINT:   Chief Complaint   Patient presents with    Physical     Physical exam, pt is feeling super.            The following individual(s) verbally consented to be recorded using ambient AI listening technology and understand that they can each withdraw their consent to this listening technology at any point by asking the clinician to turn off or pause the recording:    Patient name: Valeria Laureano      HPI:   Valeria Laureano is a 24 year old female who presents for a complete physical exam.   Problem List[1]     History of Present Illness  Valeria Laureano is a 24 year old female who presents for an annual physical exam and review of laboratory workup.    She has Type 1 Diabetes Mellitus and uses an insulin pump for management. She recently transitioned from a Medtronic pump to a Bionic pump, resulting in improved glycemic control. She monitors her carbohydrate intake, avoiding sweets and being cautious with high-carbohydrate foods such as rice, noodles, and bread. She walks about 1,000 steps daily and aims to increase this to 4,000 steps.    She has hypothyroidism and takes Synthroid before meals. Her TSH levels, previously elevated, are now stable with medication.    She experiences irregular menstrual cycles, with the last period on May 18. She is undergoing evaluation with her gynecologist, who has ordered an ultrasound and blood work. She is on medroxyprogesterone to help regulate her cycle.    She experiences occasional constipation, characterized by difficulty passing stool and abdominal pain that resolves within five minutes. She has had two recent episodes and is monitoring the situation.    She has a history of elevated alkaline phosphatase levels, persistently high since at least March 2024.    Her vitamin B12 levels are on the low side of normal, and her iron levels are low. Her vitamin D levels are also low. She is considering vitamin B12 injections and  will be taking a weekly vitamin D capsule for 12 weeks.    She has not visited a dentist in five years due to a busy schedule with multiple medical appointments. She has some black spots on her teeth, which may be cavities.    She does not smoke, drink alcohol, or use any other substances. She drives with a seatbelt and has no family history of breast or colon cancer.    Occupation:  She works part-time as an  at [employer] and  at a center in [place]. She has two younger brothers and her father has type 2 diabetes.     Exercise:  Walking   Eye exam: May 20  Dentist: Last time went to the dentist 5 years ago.  Driving: Yes, with the seatbelt  Smoking: Never  Alcohol: No  No other substance use.     Vaccinations:  Influenza: Out of season  COVID: Vaccinated x 1 J&J, no recent boosters   Pneumococcal: Due, Diabetic, will give in the office today 6/27/2025  Tdap/Td: 07/15/2021    Screenings:  Mammogram: -,  Not currently indicated ,No family history of breast cancer   Colonoscopy: -,   not indicated, No family history of Colon cancer  Diabetes screening: Last A1c value was 8.6% done 5/19/2025.    Dexa:Not on file  PAP: - . Due,  will see GYN for that  Lipid screening: Cholesterol: 149, done on 5/19/2025.  HDL Cholesterol: 45, done on 5/19/2025.  TriGlycerides 92, done on 5/19/2025.  LDL Cholesterol: 87, done on 5/19/2025.     Wt Readings from Last 6 Encounters:   06/27/25 170 lb 1.6 oz (77.2 kg)   06/26/25 170 lb (77.1 kg)   05/09/25 165 lb (74.8 kg)     Body mass index is 28.31 kg/m².       Current Medications[2]   Past Medical History[3]   Past Surgical History[4]   Family History[5]        REVIEW OF SYSTEMS:   Review of Systems   Constitutional:  Negative for chills and fever.   HENT:  Positive for hearing loss (chronic). Negative for congestion, ear pain and sinus pain.    Eyes:  Negative for blurred vision, double vision and pain.   Respiratory:  Negative for cough, shortness of breath and  wheezing.    Cardiovascular:  Negative for chest pain, palpitations and leg swelling.   Gastrointestinal:  Negative for abdominal pain, constipation, diarrhea, heartburn, nausea and vomiting.   Genitourinary:  Negative for frequency and urgency.   Musculoskeletal:  Negative for back pain and joint pain.   Skin:  Negative for rash.   Neurological:  Negative for dizziness and headaches.   Psychiatric/Behavioral: Negative.          EXAM:   /66   Pulse 91   Temp 97.3 °F (36.3 °C) (Temporal)   Ht 5' 5\" (1.651 m)   Wt 170 lb 1.6 oz (77.2 kg)   LMP 05/08/2025 (Exact Date)   SpO2 97%   BMI 28.31 kg/m²   Body mass index is 28.31 kg/m².   Physical Exam  Vitals reviewed.   Constitutional:       General: She is not in acute distress.     Appearance: Normal appearance. She is not ill-appearing or toxic-appearing.   HENT:      Head: Normocephalic and atraumatic.      Comments: Hearing aid in the right ear     Right Ear: Tympanic membrane, ear canal and external ear normal.      Left Ear: Tympanic membrane, ear canal and external ear normal.      Mouth/Throat:      Mouth: Mucous membranes are moist.      Pharynx: Oropharynx is clear. No oropharyngeal exudate or posterior oropharyngeal erythema.   Eyes:      Extraocular Movements: Extraocular movements intact.      Conjunctiva/sclera: Conjunctivae normal.      Pupils: Pupils are equal, round, and reactive to light.   Cardiovascular:      Rate and Rhythm: Normal rate and regular rhythm.      Pulses: Normal pulses.      Heart sounds: Normal heart sounds. No murmur heard.     No friction rub. No gallop.   Pulmonary:      Effort: Pulmonary effort is normal. No respiratory distress.      Breath sounds: Normal breath sounds. No stridor. No wheezing, rhonchi or rales.   Abdominal:      General: Abdomen is flat. There is no distension.      Palpations: There is no mass.      Tenderness: There is no abdominal tenderness. There is no right CVA tenderness, left CVA tenderness,  guarding or rebound.      Hernia: No hernia is present.   Musculoskeletal:      Cervical back: Normal range of motion.      Right lower leg: No edema.      Left lower leg: No edema.   Lymphadenopathy:      Cervical: No cervical adenopathy.   Skin:     General: Skin is warm.      Capillary Refill: Capillary refill takes less than 2 seconds.      Coloration: Skin is not jaundiced or pale.      Findings: No bruising, erythema, lesion or rash.   Neurological:      General: No focal deficit present.      Mental Status: She is alert and oriented to person, place, and time.      Cranial Nerves: No cranial nerve deficit.      Sensory: No sensory deficit.      Motor: No weakness.   Psychiatric:         Mood and Affect: Mood normal.         Behavior: Behavior normal.         Thought Content: Thought content normal.         Judgment: Judgment normal.          Labs:   Lab Results   Component Value Date/Time    WBC 9.6 05/19/2025 10:31 AM    HGB 12.0 05/19/2025 10:31 AM    .0 05/19/2025 10:31 AM      Lab Results   Component Value Date/Time    GLU 88 05/19/2025 10:31 AM     05/19/2025 10:31 AM    K 3.8 05/19/2025 10:31 AM     05/19/2025 10:31 AM    CO2 25.0 05/19/2025 10:31 AM    CREATSERUM 0.71 05/19/2025 10:31 AM    CA 9.4 05/19/2025 10:31 AM    ALB 4.5 05/19/2025 10:31 AM    TP 7.1 05/19/2025 10:31 AM    ALKPHO 146 (H) 05/19/2025 10:31 AM    AST 27 05/19/2025 10:31 AM    ALT 36 05/19/2025 10:31 AM    BILT 0.4 05/19/2025 10:31 AM    TSH 3.075 05/19/2025 10:31 AM    T4F 1.5 05/19/2025 10:31 AM        Lab Results   Component Value Date/Time    CHOLEST 149 05/19/2025 10:31 AM    HDL 45 05/19/2025 10:31 AM    TRIG 92 05/19/2025 10:31 AM    LDL 87 05/19/2025 10:31 AM    NONHDLC 104 05/19/2025 10:31 AM       Lab Results   Component Value Date/Time    A1C 8.6 (H) 05/19/2025 10:31 AM      Vitamin D:    Lab Results   Component Value Date    VITD 25.7 (L) 05/19/2025           ASSESSMENT AND PLAN:   Valeria Laureano  is a 24 year old female who presents for a complete physical exam.       1. Type 1 diabetes mellitus without complication (HCC)  Managing with a Bionic insulin pump and Dexcom continuous glucose monitoring. Improved blood glucose control with hemoglobin A1c at 8.6%, aiming for less than 7%. Dietary modifications and increased physical activity are recommended to further improve glycemic control.  Continue to follow with Endocrinology.  - Continue Bionic insulin pump and Dexcom continuous glucose monitor.  - Monitor hemoglobin A1c with a goal of less than 7%.  - Encourage dietary modifications to reduce carbohydrate intake and increase vegetables and lean protein.  - Increase physical activity to at least 4,000 steps per day.  - Prevnar 20 (PCV20) [90782]    2. Elevated alkaline phosphatase level  Persistently elevated alkaline phosphatase levels may indicate a liver issue. Further investigation is needed to determine the cause.  - Order liver autoimmune disorder panel.  - Consider liver ultrasound if alkaline phosphatase remains elevated.  - GGT (Gamma Glutamyl Transpeptidase); Future  - Immunoglobulin G, Quant; Future  - Alpha-1-antirypsin, serum; Future  - Ceruloplasmin; Future  - Intrinsic Factor Abs, Serum [E]; Future    3. Elevated LFTs  - Actin (Smooth Muscle) Antibody; Future  - Connective Tissue Disease (MARGARITA) Screen, Reflex Specific Antibody; Future    4. Iron deficiency  Low iron levels and slightly low MCV indicate iron deficiency. Oral iron supplements are advised, with adjustments based on tolerance. Avoid calcium intake around the time of iron supplementation to enhance absorption.  - Prescribe oral iron supplements to be taken daily, with adjustments based on tolerance.  - Recheck iron levels in 3 months.  - Avoid calcium intake 2 hours before and after taking iron supplements  - ferrous sulfate 325 (65 FE) MG Oral Tab EC; Take 1 tablet (325 mg total) by mouth daily with breakfast.  Dispense: 90  tablet; Refill: 2  - Ferritin [E]; Future    5. Vitamin D deficiency  Vitamin D levels are on the low side. A course of vitamin D supplementation is recommended to improve levels.  - Prescribe vitamin D capsules to be taken once a week for 12 weeks.  - ergocalciferol 1.25 MG (66061 UT) Oral Cap; Take 1 capsule (50,000 Units total) by mouth once a week.  Dispense: 12 capsule; Refill: 0    6. Vitamin B12 deficiency  Vitamin B12 levels are on the low side of normal. Vitamin B12 injections are recommended to address potential absorption issues. Intrinsic factor will be checked to assess absorption capability.  - Administer vitamin B12 injections weekly for 4 weeks, then monthly for 6 months.  - Check intrinsic factor to assess vitamin B12 absorption.  - cyanocobalamin (Vitamin B12) 1000 MCG/ML injection 1,000 mcg    7. Hypothyroidism due to Hashimoto thyroiditis  TSH levels are well-controlled with Synthroid. Continued adherence to Synthroid is necessary to maintain thyroid function.  - Continue Synthroid 175 MCG Oral daily.  - Monitor thyroid function tests regularly.    8. Irregular periods/menstrual cycles   Reports irregular menstrual cycles, with the last period in May. Under the care of a gynecologist who has ordered an ultrasound and blood work to investigate potential hormonal issues. Medroxyprogesterone prescribed to induce menstruation.  - Follow up with gynecologist for ultrasound and blood work results.  - Take medroxyprogesterone as prescribed to induce menstruation.    General Health Maintenance  Due for a pneumonia vaccine due to increased risk associated with diabetes. She has not visited a dentist in 5 years and is advised to consider a dental check-up.  - Administer pneumonia vaccine today.  - Encourage dental check-up and research providers who accept her insurance.    Follow-up  Requires follow-up for various health issues and screenings. Diabetic eye exam results need to be sent to the provider.  -  Follow up with gynecologist for menstrual irregularities.  - Recheck iron levels in 3 months.  - Monitor vitamin B12 and vitamin D levels after supplementation.  - Schedule liver ultrasound if indicated by lab results.  - Ensure diabetic eye exam results are sent to the provider.     45 minutes spent on provision of medical services today, including chart review, obtaining and reviewing history, performing medically appropriate examination, counseling and educating patient and/or caregiver, ordering testing , medications, referrals or procedures, my independent interpretation of results, communication of results to patient and /or caregiver, care coordination, and documentation of all of the above.     Return in about 6 months (around 12/27/2025) for Med check or earlier depending on labs.    Shiloh Casas MD         [1]   Patient Active Problem List  Diagnosis    Type 1 diabetes mellitus without complication (HCC)    Hypothyroidism    Sensorineural hearing loss (SNHL) of both ears    Non compliance with medical treatment    Poorly controlled type 1 diabetes mellitus (HCC)    Elevated alkaline phosphatase level    Iron deficiency    Vitamin D deficiency    Vitamin B12 deficiency    Irregular periods/menstrual cycles   [2]   Current Outpatient Medications   Medication Sig Dispense Refill    ferrous sulfate 325 (65 FE) MG Oral Tab EC Take 1 tablet (325 mg total) by mouth daily with breakfast. 90 tablet 2    ergocalciferol 1.25 MG (54051 UT) Oral Cap Take 1 capsule (50,000 Units total) by mouth once a week. 12 capsule 0    medroxyPROGESTERone Acetate 10 MG Oral Tab Take 1 tablet (10 mg total) by mouth nightly for 10 days. Within 7 days of completing the course, a bleed should occur. 10 tablet 0    Insulin Lispro, 1 Unit Dial, (ADMELOG SOLOSTAR) 100 UNIT/ML Subcutaneous Solution Pen-injector Inject 14 Units into the skin 3 (three) times daily.      SYNTHROID 175 MCG Oral Tab Take 1 tablet every day by oral route for 90  days. (Patient taking differently: 200 mg)      metFORMIN 500 MG Oral Tab Take 2 tablets (1,000 mg total) by mouth 2 (two) times daily with meals.      insulin glargine 100 UNIT/ML Subcutaneous Solution Pen-injector Inject 42 Units into the skin daily.      insulin glargine (BASAGLAR KWIKPEN) 100 UNIT/ML Subcutaneous Solution Pen-injector Inject 32 Units into the skin daily.      Glucose 4-6 GM-MG Oral Chew Tab Chew 16 g by mouth.      Continuous Glucose  (DEXCOM G6 ) Does not apply Device Dexcom G6       Alcohol Swabs 70 % Does not apply Pads APPLY 4 PADS EVERY DAY BY TOPICAL ROUTE AS DIRECTED FOR 90 DAYS      Continuous Glucose Sensor (GUARDIAN 4 GLUCOSE SENSOR) Does not apply Misc USE AS DIRECTED. CHANGE EVERY 10 DAYS FOR 90 DAYS      Continuous Glucose Sensor (DEXCOM G6 SENSOR) Does not apply Misc Dexcom G6 Sensor device   USE SUBCUTANEOUSLY DAILY AS DIRECTED      insulin lispro (HUMALOG) 100 UNIT/ML Injection Solution Inject into the skin 3 (three) times daily before meals.      Continuous Glucose  (DEXCOM G6 ) Does not apply Device USE SUBCUTANEOUSLY DAILY AS DIRECTED (Patient not taking: Reported on 6/27/2025)     [3]   Past Medical History:   DKA (diabetic ketoacidosis) (HCC)    9/5/24 Admission for DKA at [facility]    Hypothyroidism    Migraine with aura    Sometimes black dots in vision during the headache. For about 30 seconds    Sensorineural hearing loss (SNHL) of both ears    Congenital hearing loss. Right ear deaf, Left ear 80% hearing loss - hearing aid used in left ear    Type 1 diabetes mellitus without complication (HCC)    Diagnosed at age 18    Uses hearing aid    in left ear    Wears glasses   [4]   Past Surgical History:  Procedure Laterality Date    Dental surgery procedure      surgery to redirect how the teeth were aligning.   [5]   Family History  Problem Relation Age of Onset    Diabetes Father 45        type 2    Breast Cancer Neg      Ovarian Cancer Neg     Colon Cancer Neg     Birth Defects Neg     Genetic Disease Neg     Endometriosis Neg     Infertility Neg     Thyroid disease Neg

## 2025-06-27 NOTE — PATIENT INSTRUCTIONS
VISIT SUMMARY:  Today, you had your annual physical exam and reviewed your lab results. We discussed your management of Type 1 Diabetes, hypothyroidism, iron deficiency, vitamin B12 and D deficiencies, elevated alkaline phosphatase levels, and irregular menstrual cycles. We also talked about general health maintenance, including vaccinations and dental care.    YOUR PLAN:  -TYPE 1 DIABETES MELLITUS: Type 1 Diabetes Mellitus is a condition where your body does not produce insulin, requiring you to use an insulin pump. Your blood sugar control has improved with the Bionic insulin pump and continuous glucose monitoring. Continue using your Bionic insulin pump and Dexcom monitor, aim to reduce your hemoglobin A1c to less than 7%, modify your diet to reduce carbohydrate intake, and increase your physical activity to at least 4,000 steps per day.    -HYPOTHYROIDISM DUE TO HASHIMOTO THYROIDITIS: Hypothyroidism is a condition where your thyroid does not produce enough hormones. Your TSH levels are stable with Synthroid. Continue taking Synthroid 175 MCG daily and monitor your thyroid function regularly.    -IRON DEFICIENCY: Iron deficiency means you have low iron levels, which can cause fatigue and weakness. Take oral iron supplements daily, avoid calcium intake 2 hours before and after taking the supplements, and recheck your iron levels in 3 months.    -VITAMIN B12 DEFICIENCY: Vitamin B12 deficiency can lead to anemia and neurological issues. You will receive vitamin B12 injections weekly for 4 weeks, then monthly for 6 months. We will also check your intrinsic factor to assess absorption.    -VITAMIN D DEFICIENCY: Vitamin D deficiency can affect bone health. You will take vitamin D capsules once a week for 12 weeks to improve your levels.    -ELEVATED ALKALINE PHOSPHATASE: Elevated alkaline phosphatase levels may indicate a liver issue. We will order a liver autoimmune disorder panel and consider a liver ultrasound if  levels remain elevated.    -IRREGULAR MENSTRUAL CYCLES: Irregular menstrual cycles can be caused by hormonal imbalances. Follow up with your gynecologist for ultrasound and blood work results, and take medroxyprogesterone as prescribed to induce menstruation.    -GENERAL HEALTH MAINTENANCE: You are due for a pneumonia vaccine due to your diabetes. You are also encouraged to visit a dentist, as you have not had a dental check-up in 5 years. We will administer the pneumonia vaccine today and you should research dental providers who accept your insurance.    INSTRUCTIONS:  Follow up with your gynecologist for menstrual irregularities. Recheck your iron levels in 3 months. Monitor your vitamin B12 and vitamin D levels after supplementation. Schedule a liver ultrasound if indicated by lab results. Ensure your diabetic eye exam results are sent to us.    Contains text generated by Pk

## 2025-06-28 PROBLEM — N92.6 IRREGULAR PERIODS/MENSTRUAL CYCLES: Status: ACTIVE | Noted: 2025-06-28

## 2025-06-28 PROBLEM — R74.8 ELEVATED ALKALINE PHOSPHATASE LEVEL: Status: ACTIVE | Noted: 2025-06-28

## 2025-06-28 PROBLEM — E55.9 VITAMIN D DEFICIENCY: Status: ACTIVE | Noted: 2025-06-28

## 2025-06-28 PROBLEM — E61.1 IRON DEFICIENCY: Status: ACTIVE | Noted: 2025-06-28

## 2025-06-28 PROBLEM — E53.8 VITAMIN B12 DEFICIENCY: Status: ACTIVE | Noted: 2025-06-28

## 2025-07-03 ENCOUNTER — NURSE ONLY (OUTPATIENT)
Dept: INTERNAL MEDICINE CLINIC | Facility: CLINIC | Age: 25
End: 2025-07-03
Payer: MEDICAID

## 2025-07-03 RX ADMIN — CYANOCOBALAMIN 1000 MCG: 1000 INJECTION, SOLUTION INTRAMUSCULAR; SUBCUTANEOUS at 11:35:00

## 2025-07-09 ENCOUNTER — HOSPITAL ENCOUNTER (OUTPATIENT)
Dept: ULTRASOUND IMAGING | Age: 25
Discharge: HOME | End: 2025-07-09
Attending: OBSTETRICS & GYNECOLOGY
Payer: MEDICAID

## 2025-07-09 DIAGNOSIS — N92.6 IRREGULAR PERIODS: ICD-10-CM

## 2025-07-09 DIAGNOSIS — R14.0 ABDOMINAL DISTENSION: ICD-10-CM

## 2025-07-09 DIAGNOSIS — L68.0 HIRSUTISM: ICD-10-CM

## 2025-07-09 PROCEDURE — 76856 US EXAM PELVIC COMPLETE: CPT | Performed by: OBSTETRICS & GYNECOLOGY

## 2025-07-09 PROCEDURE — 76830 TRANSVAGINAL US NON-OB: CPT | Performed by: OBSTETRICS & GYNECOLOGY

## 2025-07-10 ENCOUNTER — MED REC SCAN ONLY (OUTPATIENT)
Dept: INTERNAL MEDICINE CLINIC | Facility: CLINIC | Age: 25
End: 2025-07-10

## 2025-07-10 ENCOUNTER — NURSE ONLY (OUTPATIENT)
Dept: INTERNAL MEDICINE CLINIC | Facility: CLINIC | Age: 25
End: 2025-07-10
Payer: MEDICAID

## 2025-07-10 RX ADMIN — CYANOCOBALAMIN 1000 MCG: 1000 INJECTION, SOLUTION INTRAMUSCULAR; SUBCUTANEOUS at 11:30:00

## 2025-07-17 ENCOUNTER — NURSE ONLY (OUTPATIENT)
Dept: INTERNAL MEDICINE CLINIC | Facility: CLINIC | Age: 25
End: 2025-07-17
Payer: MEDICAID

## 2025-07-17 PROCEDURE — 96372 THER/PROPH/DIAG INJ SC/IM: CPT | Performed by: STUDENT IN AN ORGANIZED HEALTH CARE EDUCATION/TRAINING PROGRAM

## 2025-07-17 RX ADMIN — CYANOCOBALAMIN 1000 MCG: 1000 INJECTION, SOLUTION INTRAMUSCULAR; SUBCUTANEOUS at 13:13:00

## 2025-07-17 NOTE — PROGRESS NOTES
Patient is aware this is 4th weekly B-12 since visit with Dr. Casas. The next one I is  in 1 month. Appointment scheduled. B-12 injection given IM and patient tolerated well.

## 2025-07-18 ENCOUNTER — LAB ENCOUNTER (OUTPATIENT)
Dept: LAB | Age: 25
End: 2025-07-18
Attending: STUDENT IN AN ORGANIZED HEALTH CARE EDUCATION/TRAINING PROGRAM
Payer: MEDICAID

## 2025-07-18 DIAGNOSIS — L68.0 HIRSUTISM: ICD-10-CM

## 2025-07-18 DIAGNOSIS — N92.6 IRREGULAR PERIODS: ICD-10-CM

## 2025-07-18 LAB
DHEA-S SERPL-MCNC: 194.1 UG/DL (ref 25.9–460.2)
EST. AVERAGE GLUCOSE BLD GHB EST-MCNC: 183 MG/DL (ref 68–126)
ESTRADIOL SERPL-MCNC: 39.8 PG/ML
FSH SERPL-ACNC: 4.8 MIU/ML
HBA1C MFR BLD: 8 % (ref ?–5.7)
PROGEST SERPL-MCNC: 0.65 NG/ML
PROLACTIN SERPL-MCNC: 14.7 NG/ML
T4 FREE SERPL-MCNC: 1.2 NG/DL (ref 0.8–1.7)
TSI SER-ACNC: 13.19 UIU/ML (ref 0.55–4.78)

## 2025-07-18 PROCEDURE — 82627 DEHYDROEPIANDROSTERONE: CPT

## 2025-07-18 PROCEDURE — 84443 ASSAY THYROID STIM HORMONE: CPT

## 2025-07-18 PROCEDURE — 82670 ASSAY OF TOTAL ESTRADIOL: CPT

## 2025-07-18 PROCEDURE — 84439 ASSAY OF FREE THYROXINE: CPT

## 2025-07-18 PROCEDURE — 84143 ASSAY OF 17-HYDROXYPREGNENO: CPT

## 2025-07-18 PROCEDURE — 83036 HEMOGLOBIN GLYCOSYLATED A1C: CPT

## 2025-07-18 PROCEDURE — 83520 IMMUNOASSAY QUANT NOS NONAB: CPT

## 2025-07-18 PROCEDURE — 84146 ASSAY OF PROLACTIN: CPT

## 2025-07-18 PROCEDURE — 84144 ASSAY OF PROGESTERONE: CPT

## 2025-07-18 PROCEDURE — 36415 COLL VENOUS BLD VENIPUNCTURE: CPT

## 2025-07-18 PROCEDURE — 84410 TESTOSTERONE BIOAVAILABLE: CPT

## 2025-07-18 PROCEDURE — 83001 ASSAY OF GONADOTROPIN (FSH): CPT

## 2025-07-19 PROBLEM — N83.291 COMPLEX CYST OF RIGHT OVARY: Status: ACTIVE | Noted: 2025-07-19

## 2025-07-21 LAB — MULLERIAN AMH: 4.48 NG/ML

## 2025-07-22 LAB
17-OH PROGESTERONE: 31 NG/DL
SEX HORM BIND GLOB: 12.5 NMOL/L
TESTOST % FREE+WEAK BND: 38.7 %
TESTOST FREE+WEAK BND: 7.6 NG/DL
TESTOSTERONE TOT /MS: 19.7 NG/DL

## 2025-07-25 ENCOUNTER — OFFICE VISIT (OUTPATIENT)
Dept: INTERNAL MEDICINE CLINIC | Facility: CLINIC | Age: 25
End: 2025-07-25
Payer: MEDICAID

## 2025-08-06 ENCOUNTER — TELEPHONE (OUTPATIENT)
Dept: INTERNAL MEDICINE CLINIC | Facility: CLINIC | Age: 25
End: 2025-08-06

## 2025-08-09 ENCOUNTER — MED REC SCAN ONLY (OUTPATIENT)
Dept: INTERNAL MEDICINE CLINIC | Facility: CLINIC | Age: 25
End: 2025-08-09

## 2025-08-18 ENCOUNTER — NURSE ONLY (OUTPATIENT)
Dept: INTERNAL MEDICINE CLINIC | Facility: CLINIC | Age: 25
End: 2025-08-18

## 2025-08-18 RX ADMIN — CYANOCOBALAMIN 1000 MCG: 1000 INJECTION, SOLUTION INTRAMUSCULAR; SUBCUTANEOUS at 13:45:00
